# Patient Record
Sex: MALE | URBAN - METROPOLITAN AREA
[De-identification: names, ages, dates, MRNs, and addresses within clinical notes are randomized per-mention and may not be internally consistent; named-entity substitution may affect disease eponyms.]

---

## 2018-06-13 ENCOUNTER — OFFICE VISIT (OUTPATIENT)
Dept: FAMILY MEDICINE CLINIC | Age: 63
End: 2018-06-13

## 2018-06-13 VITALS — HEIGHT: 69 IN | WEIGHT: 280 LBS | BODY MASS INDEX: 41.47 KG/M2 | RESPIRATION RATE: 20 BRPM

## 2018-06-13 PROBLEM — I10 HTN (HYPERTENSION): Status: ACTIVE | Noted: 2018-06-13

## 2018-06-13 PROBLEM — E78.00 HYPERCHOLESTEREMIA: Status: ACTIVE | Noted: 2018-06-13

## 2018-06-13 PROBLEM — G47.30 SLEEP APNEA: Status: ACTIVE | Noted: 2018-06-13

## 2018-06-13 PROBLEM — J45.909 ASTHMA: Status: ACTIVE | Noted: 2018-06-13

## 2018-06-13 RX ORDER — GUAIFENESIN 100 MG/5ML
162 LIQUID (ML) ORAL
COMMUNITY
Start: 2010-02-28

## 2018-06-13 RX ORDER — BLOOD SUGAR DIAGNOSTIC
STRIP MISCELLANEOUS
COMMUNITY
Start: 2018-04-10 | End: 2018-07-24 | Stop reason: SDUPTHER

## 2018-06-13 RX ORDER — CLONIDINE HYDROCHLORIDE 0.3 MG/1
0.3 TABLET ORAL
COMMUNITY
Start: 2018-05-29 | End: 2018-11-20 | Stop reason: SDUPTHER

## 2018-06-13 RX ORDER — LIRAGLUTIDE 6 MG/ML
1.8 INJECTION SUBCUTANEOUS
COMMUNITY
Start: 2018-05-15 | End: 2018-10-08 | Stop reason: SDUPTHER

## 2018-06-13 RX ORDER — AMITRIPTYLINE HYDROCHLORIDE 25 MG/1
TABLET, FILM COATED ORAL
COMMUNITY
Start: 2018-04-16 | End: 2018-07-09 | Stop reason: SDUPTHER

## 2018-06-13 RX ORDER — EPINEPHRINE 0.3 MG/.3ML
INJECTION SUBCUTANEOUS
COMMUNITY
Start: 2018-05-19

## 2018-06-13 RX ORDER — METFORMIN HYDROCHLORIDE 1000 MG/1
TABLET ORAL
COMMUNITY
Start: 2018-05-21 | End: 2018-11-13 | Stop reason: SDUPTHER

## 2018-06-13 RX ORDER — SIMVASTATIN 20 MG/1
TABLET, FILM COATED ORAL
COMMUNITY
Start: 2018-04-09 | End: 2018-10-08 | Stop reason: SDUPTHER

## 2018-06-13 RX ORDER — GLIPIZIDE 10 MG/1
10 TABLET, FILM COATED, EXTENDED RELEASE ORAL 2 TIMES DAILY
COMMUNITY
Start: 2018-04-16 | End: 2018-07-18 | Stop reason: SDUPTHER

## 2018-06-13 RX ORDER — AMLODIPINE BESYLATE 10 MG/1
TABLET ORAL
COMMUNITY
Start: 2018-04-09 | End: 2018-10-15 | Stop reason: SDUPTHER

## 2018-06-13 RX ORDER — ALBUTEROL SULFATE 90 UG/1
AEROSOL, METERED RESPIRATORY (INHALATION)
COMMUNITY
Start: 2018-05-30

## 2018-06-27 ENCOUNTER — OFFICE VISIT (OUTPATIENT)
Dept: FAMILY MEDICINE CLINIC | Age: 63
End: 2018-06-27

## 2018-06-27 VITALS
HEIGHT: 69 IN | BODY MASS INDEX: 40.58 KG/M2 | TEMPERATURE: 98.3 F | HEART RATE: 110 BPM | SYSTOLIC BLOOD PRESSURE: 168 MMHG | OXYGEN SATURATION: 97 % | DIASTOLIC BLOOD PRESSURE: 88 MMHG | RESPIRATION RATE: 24 BRPM | WEIGHT: 274 LBS

## 2018-06-27 DIAGNOSIS — E78.2 MIXED HYPERLIPIDEMIA: Primary | ICD-10-CM

## 2018-06-27 DIAGNOSIS — I10 ESSENTIAL HYPERTENSION: ICD-10-CM

## 2018-06-27 DIAGNOSIS — E11.9 DIABETES MELLITUS WITHOUT COMPLICATION (HCC): ICD-10-CM

## 2018-06-27 PROBLEM — G47.33 OSA ON CPAP: Status: ACTIVE | Noted: 2018-06-13

## 2018-06-27 PROBLEM — E78.00 HYPERCHOLESTEREMIA: Status: RESOLVED | Noted: 2018-06-13 | Resolved: 2018-06-27

## 2018-06-27 PROBLEM — Z99.89 OSA ON CPAP: Status: ACTIVE | Noted: 2018-06-13

## 2018-06-27 PROBLEM — Z86.73 HISTORY OF CVA (CEREBROVASCULAR ACCIDENT): Status: ACTIVE | Noted: 2018-06-27

## 2018-06-27 RX ORDER — MECLIZINE HYDROCHLORIDE 25 MG/1
TABLET ORAL
COMMUNITY

## 2018-06-27 RX ORDER — FLUTICASONE PROPIONATE 50 MCG
2 SPRAY, SUSPENSION (ML) NASAL DAILY
COMMUNITY
End: 2018-07-06 | Stop reason: SDUPTHER

## 2018-06-27 RX ORDER — VALSARTAN 80 MG/1
80 TABLET ORAL DAILY
Qty: 30 TAB | Refills: 2 | Status: SHIPPED | OUTPATIENT
Start: 2018-06-27 | End: 2018-07-18 | Stop reason: SDUPTHER

## 2018-06-27 NOTE — MR AVS SNAPSHOT
[address] 9731 [facility] [id number] [phone] Patient: Brenda Pace 
MRN: [id number]:[id number] Visit Information Date & Time Provider Department Dept. Phone Encounter #  
 6/27/2018  3:15 PM Adria Chavis, 3 [facility] [phone] [id number] Follow-up Instructions Return in about 3 weeks (around 7/18/2018). Upcoming Health Maintenance Date Due Hepatitis C Screening 1955 Pneumococcal 19-64 Medium Risk (1 of 1 - PPSV23) 5/15/1975 DTaP/Tdap/Td series (1 - Tdap) 5/15/1977 FOBT Q 1 YEAR AGE 50-75 5/15/2006 ZOSTER VACCINE AGE 60> 3/15/2016 Influenza Age 5 to Adult 8/1/2018 Allergies as of 6/27/2018  Review Complete On: 6/27/2018 By: Adria Chavis MD  
  
 Severity Noted Reaction Type Reactions Bee Venom Protein (Honey Bee) High 06/13/2018    Anaphylaxis Pcn [Penicillins] High 06/13/2018    Anaphylaxis Tetracycline High 06/13/2018    Anaphylaxis Current Immunizations  Never Reviewed No immunizations on file. Not reviewed this visit You Were Diagnosed With   
  
 Codes Comments Mixed hyperlipidemia    -  Primary ICD-10-CM: U77.4 ICD-9-CM: 272.2 Essential hypertension     ICD-10-CM: I10 
ICD-9-CM: 401.9 Diabetes mellitus without complication ([facility]     HOP-88-FN: E11.9 ICD-9-CM: 250.00 Vitals BP Pulse Temp Resp Height(growth percentile) Weight(growth percentile) 168/88 (!) 110 98.3 °F (36.8 °C) 24 5' 9\" (1.753 m) 274 lb (124.3 kg) SpO2 BMI Smoking Status 97% 40.46 kg/m2 Never Smoker BMI and BSA Data Body Mass Index Body Surface Area 40.46 kg/m 2 2.46 m 2 Preferred Pharmacy Pharmacy Name Phone [phone], [address] [phone] Your Updated Medication List  
  
   
 This list is accurate as of 6/27/18  4:16 PM.  Always use your most recent med list.  
  
  
  
  
 ACCU-CHEK AYDEN PLUS TEST STRP strip Generic drug:  glucose blood VI test strips  
  
 amitriptyline 25 mg tablet Commonly known as:  ELAVIL  
  
 amLODIPine 10 mg tablet Commonly known as:  NORVASC  
  
 aspirin 81 mg chewable tablet 162 mg.  
  
 cloNIDine HCl 0.3 mg tablet Commonly known as:  CATAPRES  
0.3 mg every morning. EPINEPHrine 0.3 mg/0.3 mL injection Commonly known as:  EPIPEN  
  
 FLONASE ALLERGY RELIEF 50 mcg/actuation nasal spray Generic drug:  fluticasone 2 Sprays by Both Nostrils route daily. glipiZIDE SR 10 mg CR tablet Commonly known as:  GLUCOTROL XL  
10 mg two (2) times a day. meclizine 25 mg tablet Commonly known as:  ANTIVERT Take  by mouth three (3) times daily as needed. metFORMIN 1,000 mg tablet Commonly known as:  GLUCOPHAGE NOVOFINE 30 30 gauge x 1/3\" Generic drug:  Insulin Needles (Disposable)  
  
 simvastatin 20 mg tablet Commonly known as:  ZOCOR  
  
 valsartan 80 mg tablet Commonly known as:  DIOVAN Take 1 Tab by mouth daily. VENTOLIN HFA 90 mcg/actuation inhaler Generic drug:  albuterol VICTOZA 3-MERCEDES 0.6 mg/0.1 mL (18 mg/3 mL) Pnij Generic drug:  Liraglutide 1.8 mg.  
  
  
  
  
Prescriptions Sent to Pharmacy Refills  
 valsartan (DIOVAN) 80 mg tablet 2 Sig: Take 1 Tab by mouth daily. Class: Normal  
 Pharmacy: [address] [place] [address] #: [phone] Route: Oral  
  
Follow-up Instructions Return in about 3 weeks (around 7/18/2018). To-Do List   
 06/29/2018 Lab:  CBC WITH AUTOMATED DIFF   
  
 06/29/2018 Lab:  HEMOGLOBIN A1C WITH EAG   
  
 06/29/2018 Lab:  LIPID PANEL   
  
 06/29/2018 Lab:  METABOLIC PANEL, COMPREHENSIVE   
  
 06/29/2018 Lab:  MICROALBUMIN, UR, RAND W/ MICROALB/CREAT RATIO 06/29/2018 Lab:  T4, FREE   
  
 06/29/2018 Lab:  TSH 3RD GENERATION Introducing [facility]! [employer] introduces [employer] patient portal. Now you can access parts of your medical record, email your doctor's office, and request medication refills online. 1. In your internet browser, go to [email or web address] 2. Click on the First Time User? Click Here link in the Sign In box. You will see the New Member Sign Up page. 3. Enter your [employer] Access Code exactly as it appears below. You will not need to use this code after youve completed the sign-up process. If you do not sign up before the expiration date, you must request a new code. · [employer] Access Code: [email or web address] Expires: 9/25/2018  4:16 PM 
 
4. Enter the last four digits of your Social Security Number (xxxx) and Date of Birth (mm/dd/yyyy) as indicated and click Submit. You will be taken to the next sign-up page. 5. Create a [employer] ID. This will be your [employer] login ID and cannot be changed, so think of one that is secure and easy to remember. 6. Create a [employer] password. You can change your password at any time. 7. Enter your Password Reset Question and Answer. This can be used at a later time if you forget your password. 8. Enter your e-mail address. You will receive e-mail notification when new information is available in [address] 9. Click Sign Up. You can now view and download portions of your medical record. 10. Click the Download Summary menu link to download a portable copy of your medical information. If you have questions, please visit the Frequently Asked Questions section of the [employer] website. Remember, [employer] is NOT to be used for urgent needs. For medical emergencies, dial 911. Now available from your iPhone and Android! Please provide this summary of care documentation to your next provider. If you have any questions after today's visit, please call [phone].

## 2018-06-27 NOTE — PROGRESS NOTES
George Rosado is a 59 y.o. male (: 1955) presenting to address:    Chief Complaint   Patient presents with   [address] Patient       Vitals:    18 1535   BP: 168/88   Pulse: (!) 110   Resp: 24   Temp: 98.3 °F (36.8 °C)   SpO2: 97%   Weight: 274 lb (124.3 kg)   Height: 5' 9\" (1.753 m)   PainSc:   0 - No pain       Hearing/Vision:   No exam data present    Learning Assessment:   No flowsheet data found. Depression Screening:     PHQ over the last two weeks 2018   Little interest or pleasure in doing things Not at all   Feeling down, depressed or hopeless Not at all   Total Score PHQ 2 0     Fall Risk Assessment:   No flowsheet data found. Abuse Screening:   No flowsheet data found. 1. Do you have an Advanced Directive? YES    2. Would you like information on Advanced Directives?  NO

## 2018-06-27 NOTE — PROGRESS NOTES
HISTORY OF PRESENT ILLNESS  Emmy Abreu is a 59 y.o. male. HPI  New pt  DM, fairly controlled, glucose is elevated in am 150-170, but 100-110 in the afternoon !, he is taking his meds daily  HTN, not controlled, bp is elevated  HLD, stable on zocor daily  Review of Systems   Constitutional: Negative for fever. Respiratory: Negative for cough and shortness of breath. Cardiovascular: Negative for chest pain and palpitations. Gastrointestinal: Negative for abdominal pain and nausea. Musculoskeletal: Negative for myalgias. Physical Exam   Constitutional: He is oriented to person, place, and time. Morbid obesity   Neck: Neck supple. No thyromegaly present. Cardiovascular: Regular rhythm and normal heart sounds. Tachycardia present. No murmur heard. Pulmonary/Chest: Effort normal and breath sounds normal. He has no wheezes. He has no rales. Abdominal: Soft. Bowel sounds are normal. There is no hepatosplenomegaly. There is no tenderness. Musculoskeletal: He exhibits no edema. Lymphadenopathy:     He has no cervical adenopathy. Neurological: He is alert and oriented to person, place, and time. Vitals reviewed. ASSESSMENT and PLAN  Diagnoses and all orders for this visit:    1. Mixed hyperlipidemia, controlled  -     LIPID PANEL; Future  -     METABOLIC PANEL, COMPREHENSIVE; Future  -     CBC WITH AUTOMATED DIFF; Future  -     T4, FREE; Future  -     TSH 3RD GENERATION; Future    2. Essential hypertension, not controlled, add diovan  -     LIPID PANEL; Future  -     METABOLIC PANEL, COMPREHENSIVE; Future  -     CBC WITH AUTOMATED DIFF; Future  -     valsartan (DIOVAN) 80 mg tablet; Take 1 Tab by mouth daily.  -     T4, FREE; Future  -     TSH 3RD GENERATION; Future    3. Diabetes mellitus without complication ([facility]  -     LIPID PANEL; Future  -     METABOLIC PANEL, COMPREHENSIVE; Future  -     HEMOGLOBIN A1C WITH EAG; Future  -     CBC WITH AUTOMATED DIFF;  Future  -     MICROALBUMIN, LEYDA, PÉREZ W/ MICROALB/CREAT RATIO; Future  -     T4, FREE; Future  -     TSH 3RD GENERATION;  Future  Will get copy of old records from previous pcp    rtc 3 wks

## 2018-07-02 ENCOUNTER — HOSPITAL ENCOUNTER (OUTPATIENT)
Dept: LAB | Age: 63
Discharge: HOME | End: 2018-07-02

## 2018-07-02 PROCEDURE — 99001 SPECIMEN HANDLING PT-LAB: CPT | Performed by: INTERNAL MEDICINE

## 2018-07-03 DIAGNOSIS — E03.9 ACQUIRED HYPOTHYROIDISM: Primary | ICD-10-CM

## 2018-07-03 LAB
ALBUMIN SERPL-MCNC: 4.6 G/DL (ref 3.6–4.8)
ALBUMIN/CREAT UR: 64.8 MG/G CREAT (ref 0–30)
ALBUMIN/GLOB SERPL: 1.5 {RATIO} (ref 1.2–2.2)
ALP SERPL-CCNC: 79 IU/L (ref 39–117)
ALT SERPL-CCNC: 26 IU/L (ref 0–44)
AST SERPL-CCNC: 26 IU/L (ref 0–40)
BASOPHILS # BLD AUTO: 0 X10E3/UL (ref 0–0.2)
BASOPHILS NFR BLD AUTO: 0 %
BILIRUB SERPL-MCNC: 0.2 MG/DL (ref 0–1.2)
BUN SERPL-MCNC: 15 MG/DL (ref 8–27)
BUN/CREAT SERPL: 13 (ref 10–24)
CALCIUM SERPL-MCNC: 9.7 MG/DL (ref 8.6–10.2)
CHLORIDE SERPL-SCNC: 98 MMOL/L (ref 96–106)
CHOLEST SERPL-MCNC: 126 MG/DL (ref 100–199)
CO2 SERPL-SCNC: 24 MMOL/L (ref 20–29)
CREAT SERPL-MCNC: 1.16 MG/DL (ref 0.76–1.27)
CREAT UR-MCNC: 141.7 MG/DL
EOSINOPHIL # BLD AUTO: 0.3 X10E3/UL (ref 0–0.4)
EOSINOPHIL NFR BLD AUTO: 3 %
ERYTHROCYTE [DISTWIDTH] IN BLOOD BY AUTOMATED COUNT: 16.9 % (ref 12.3–15.4)
EST. AVERAGE GLUCOSE BLD GHB EST-MCNC: 189 MG/DL
GLOBULIN SER CALC-MCNC: 3.1 G/DL (ref 1.5–4.5)
GLUCOSE SERPL-MCNC: 158 MG/DL (ref 65–99)
HBA1C MFR BLD: 8.2 % (ref 4.8–5.6)
HCT VFR BLD AUTO: 36.7 % (ref 37.5–51)
HDLC SERPL-MCNC: 28 MG/DL
HGB BLD-MCNC: 11.7 G/DL (ref 13–17.7)
IMM GRANULOCYTES # BLD: 0 X10E3/UL (ref 0–0.1)
IMM GRANULOCYTES NFR BLD: 0 %
INTERPRETATION, 910389: NORMAL
LDLC SERPL CALC-MCNC: 68 MG/DL (ref 0–99)
LYMPHOCYTES # BLD AUTO: 1.7 X10E3/UL (ref 0.7–3.1)
LYMPHOCYTES NFR BLD AUTO: 21 %
Lab: NORMAL
MCH RBC QN AUTO: 27.3 PG (ref 26.6–33)
MCHC RBC AUTO-ENTMCNC: 31.9 G/DL (ref 31.5–35.7)
MCV RBC AUTO: 86 FL (ref 79–97)
MICROALBUMIN UR-MCNC: 91.8 UG/ML
MONOCYTES # BLD AUTO: 0.5 X10E3/UL (ref 0.1–0.9)
MONOCYTES NFR BLD AUTO: 6 %
NEUTROPHILS # BLD AUTO: 6 X10E3/UL (ref 1.4–7)
NEUTROPHILS NFR BLD AUTO: 70 %
PLATELET # BLD AUTO: 191 X10E3/UL (ref 150–379)
POTASSIUM SERPL-SCNC: 4.4 MMOL/L (ref 3.5–5.2)
PROT SERPL-MCNC: 7.7 G/DL (ref 6–8.5)
RBC # BLD AUTO: 4.28 X10E6/UL (ref 4.14–5.8)
SODIUM SERPL-SCNC: 141 MMOL/L (ref 134–144)
T4 FREE SERPL-MCNC: 0.69 NG/DL (ref 0.82–1.77)
TRIGL SERPL-MCNC: 148 MG/DL (ref 0–149)
TSH SERPL DL<=0.005 MIU/L-ACNC: 9.74 UIU/ML (ref 0.45–4.5)
VLDLC SERPL CALC-MCNC: 30 MG/DL (ref 5–40)
WBC # BLD AUTO: 8.5 X10E3/UL (ref 3.4–10.8)

## 2018-07-03 RX ORDER — LEVOTHYROXINE SODIUM 75 UG/1
75 TABLET ORAL
Qty: 90 TAB | Refills: 0 | Status: SHIPPED | OUTPATIENT
Start: 2018-07-03 | End: 2018-07-18 | Stop reason: SDUPTHER

## 2018-07-03 NOTE — PROGRESS NOTES
A1c is elevated, he need to take victoza and his meds daily    Thyroid is low, need to start synthroid daily, which will improvehis metabolism/Diabetes/weight, Rx sent to the pharmacy    Cholesterol is good    Will discuss more next visit

## 2018-07-05 NOTE — PROGRESS NOTES
Spoke with patient, verified with 2 identifiers. Patient advised of results. Patient stated that he is not willing to take Synthroid due to issues in the past with it and his labs. Willing to discuss at next OV. No further concerns.

## 2018-07-09 RX ORDER — FLUTICASONE PROPIONATE 50 MCG
2 SPRAY, SUSPENSION (ML) NASAL DAILY
Qty: 1 BOTTLE | Refills: 3 | Status: SHIPPED | OUTPATIENT
Start: 2018-07-09

## 2018-07-09 RX ORDER — AMITRIPTYLINE HYDROCHLORIDE 25 MG/1
25 TABLET, FILM COATED ORAL
Qty: 90 TAB | Refills: 0 | Status: SHIPPED | OUTPATIENT
Start: 2018-07-09 | End: 2018-07-18 | Stop reason: SDUPTHER

## 2018-07-09 NOTE — TELEPHONE ENCOUNTER
Patient is requesting refills of Elavil 25mg   This medication is entered under \"Historical Provider\"  Last Visit: 06/27/18 He was a new patient.   Future Appointments  Date Time Provider Rohit Souza   7/18/2018 3:30 PM [facility]ener, [facility] Conf[facility]

## 2018-07-18 ENCOUNTER — OFFICE VISIT (OUTPATIENT)
Dept: FAMILY MEDICINE CLINIC | Age: 63
End: 2018-07-18

## 2018-07-18 VITALS
RESPIRATION RATE: 20 BRPM | SYSTOLIC BLOOD PRESSURE: 140 MMHG | TEMPERATURE: 98.5 F | BODY MASS INDEX: 40.88 KG/M2 | DIASTOLIC BLOOD PRESSURE: 90 MMHG | HEART RATE: 98 BPM | OXYGEN SATURATION: 96 % | HEIGHT: 69 IN | WEIGHT: 276 LBS

## 2018-07-18 DIAGNOSIS — E11.29 DIABETES MELLITUS WITH MICROALBUMINURIA (HCC): ICD-10-CM

## 2018-07-18 DIAGNOSIS — Z12.11 COLON CANCER SCREENING: ICD-10-CM

## 2018-07-18 DIAGNOSIS — R80.9 DIABETES MELLITUS WITH MICROALBUMINURIA (HCC): ICD-10-CM

## 2018-07-18 DIAGNOSIS — E03.9 ACQUIRED HYPOTHYROIDISM: Primary | ICD-10-CM

## 2018-07-18 DIAGNOSIS — F51.01 PRIMARY INSOMNIA: ICD-10-CM

## 2018-07-18 DIAGNOSIS — I10 ESSENTIAL HYPERTENSION: ICD-10-CM

## 2018-07-18 RX ORDER — AMITRIPTYLINE HYDROCHLORIDE 50 MG/1
50 TABLET, FILM COATED ORAL
Qty: 90 TAB | Refills: 1 | Status: SHIPPED | OUTPATIENT
Start: 2018-07-18 | End: 2019-03-21 | Stop reason: SDUPTHER

## 2018-07-18 RX ORDER — GLIPIZIDE 10 MG/1
10 TABLET, FILM COATED, EXTENDED RELEASE ORAL 2 TIMES DAILY
Qty: 180 TAB | Refills: 1 | Status: SHIPPED | OUTPATIENT
Start: 2018-07-18 | End: 2019-01-08 | Stop reason: SDUPTHER

## 2018-07-18 RX ORDER — LEVOTHYROXINE SODIUM 75 UG/1
75 TABLET ORAL
Qty: 90 TAB | Refills: 0 | Status: SHIPPED | OUTPATIENT
Start: 2018-07-18 | End: 2018-10-15 | Stop reason: SDUPTHER

## 2018-07-18 RX ORDER — VALSARTAN 80 MG/1
80 TABLET ORAL DAILY
Qty: 90 TAB | Refills: 1 | Status: SHIPPED | OUTPATIENT
Start: 2018-07-18 | End: 2018-07-30 | Stop reason: RX

## 2018-07-18 NOTE — MR AVS SNAPSHOT
[address] [place] [address] [address] 2201 [facility] [id number] [phone] Patient: Arslan Poag 
MRN: [id number]:[id number] Visit Information Date & Time Provider Department Dept. Phone Encounter #  
 7/18/2018  3:30 PM Caitlyn West, 3 [facility] [phone] [id number] Follow-up Instructions Return in about 3 months (around 10/18/2018). Upcoming Health Maintenance Date Due Hepatitis C Screening 1955 Pneumococcal 19-64 Medium Risk (1 of 1 - PPSV23) 5/15/1975 DTaP/Tdap/Td series (1 - Tdap) 5/15/1977 FOBT Q 1 YEAR AGE 50-75 5/15/2006 ZOSTER VACCINE AGE 60> 3/15/2016 Influenza Age 5 to Adult 8/1/2018 Allergies as of 7/18/2018  Review Complete On: 7/18/2018 By: Caitlyn West MD  
  
 Severity Noted Reaction Type Reactions Bee Venom Protein (Honey Bee) High 06/13/2018    Anaphylaxis Pcn [Penicillins] High 06/13/2018    Anaphylaxis Tetracycline High 06/13/2018    Anaphylaxis Current Immunizations  Never Reviewed No immunizations on file. Not reviewed this visit You Were Diagnosed With   
  
 Codes Comments Acquired hypothyroidism    -  Primary ICD-10-CM: E03.9 ICD-9-CM: 244.9 Diabetes mellitus with microalbuminuria (HCC)     ICD-10-CM: E11.29, R80.9 ICD-9-CM: 250.40, 791.0 Essential hypertension     ICD-10-CM: I10 
ICD-9-CM: 401.9 Primary insomnia     ICD-10-CM: F51.01 
ICD-9-CM: 307.42 Colon cancer screening     ICD-10-CM: Z12.11 ICD-9-CM: V76.51 Vitals BP Pulse Temp Resp Height(growth percentile) Weight(growth percentile) 140/90 (BP 1 Location: Left arm, BP Patient Position: Sitting) 98 98.5 °F (36.9 °C) (Oral) 20 5' 9\" (1.753 m) 276 lb (125.2 kg) SpO2 BMI Smoking Status 96% 40.76 kg/m2 Never Smoker Vitals History BMI and BSA Data Body Mass Index Body Surface Area  40.76 kg/m 2 2.47 m 2  
  
  
 Preferred Pharmacy Pharmacy Name Phone Diana [phone], [address] [phone] Your Updated Medication List  
  
   
This list is accurate as of 7/18/18  4:18 PM.  Always use your most recent med list.  
  
  
  
  
 ACCU-CHEK AYDEN PLUS TEST STRP strip Generic drug:  glucose blood VI test strips  
  
 amitriptyline 50 mg tablet Commonly known as:  ELAVIL Take 1 Tab by mouth nightly. amLODIPine 10 mg tablet Commonly known as:  NORVASC  
  
 aspirin 81 mg chewable tablet 162 mg.  
  
 cloNIDine HCl 0.3 mg tablet Commonly known as:  CATAPRES  
0.3 mg every morning. EPINEPHrine 0.3 mg/0.3 mL injection Commonly known as:  EPIPEN  
  
 fluticasone 50 mcg/actuation nasal spray Commonly known as:  FLONASE ALLERGY RELIEF  
2 Sprays by Both Nostrils route daily. glipiZIDE SR 10 mg CR tablet Commonly known as:  GLUCOTROL XL Take 1 Tab by mouth two (2) times a day. levothyroxine 75 mcg tablet Commonly known as:  SYNTHROID Take 1 Tab by mouth Daily (before breakfast). meclizine 25 mg tablet Commonly known as:  ANTIVERT Take  by mouth three (3) times daily as needed. metFORMIN 1,000 mg tablet Commonly known as:  GLUCOPHAGE NOVOFINE 30 30 gauge x 1/3\" Generic drug:  Insulin Needles (Disposable) Use daily with insulin  
  
 simvastatin 20 mg tablet Commonly known as:  ZOCOR  
  
 valsartan 80 mg tablet Commonly known as:  DIOVAN Take 1 Tab by mouth daily. VENTOLIN HFA 90 mcg/actuation inhaler Generic drug:  albuterol VICTOZA 3-MERCEDES 0.6 mg/0.1 mL (18 mg/3 mL) Pnij Generic drug:  Liraglutide 1.8 mg.  
  
  
  
  
Prescriptions Sent to Pharmacy Refills  
 levothyroxine (SYNTHROID) 75 mcg tablet 0 Sig: Take 1 Tab by mouth Daily (before breakfast).   
 Class: Normal  
 Pharmacy: [id number] [place] [address], [address] [place] [address] [facility]. Ph #: [phone] Route: Oral  
 amitriptyline (ELAVIL) 50 mg tablet 1 Sig: Take 1 Tab by mouth nightly. Class: Normal  
 Pharmacy: [address] [place] [address]. Ph #: [phone] Route: Oral  
 glipiZIDE SR (GLUCOTROL XL) 10 mg CR tablet 1 Sig: Take 1 Tab by mouth two (2) times a day. Class: Normal  
 Pharmacy: [address] [place] [address]. Ph #: [phone] Route: Oral  
 valsartan (DIOVAN) 80 mg tablet 1 Sig: Take 1 Tab by mouth daily. Class: Normal  
 Pharmacy: [address] [place] [address]. Ph #: [phone] Route: Oral  
  
Follow-up Instructions Return in about 3 months (around 10/18/2018). To-Do List   
 07/18/2018 Lab:  OCCULT BLOOD, IMMUNOASSAY (FIT)   
  
 07/18/2018 Lab:  T4, FREE   
  
 07/18/2018 Lab:  TSH 3RD GENERATION Introducing [facility]! [facility] introduces [employer] patient portal. Now you can access parts of your medical record, email your doctor's office, and request medication refills online. 1. In your internet browser, go to [email or web address] 2. Click on the First Time User? Click Here link in the Sign In box. You will see the New Member Sign Up page. 3. Enter your [employer] Access Code exactly as it appears below. You will not need to use this code after youve completed the sign-up process. If you do not sign up before the expiration date, you must request a new code. · [employer] Access Code: [email or web address] Expires: 9/25/2018  4:16 PM 
 
4. Enter the last four digits of your Social Security Number (xxxx) and Date of Birth (mm/dd/yyyy) as indicated and click Submit. You will be taken to the next sign-up page. 5. Create a [employer] ID. This will be your [employer] login ID and cannot be changed, so think of one that is secure and easy to remember. 6. Create a [employer] password. You can change your password at any time. 7. Enter your Password Reset Question and Answer. This can be used at a later time if you forget your password. 8. Enter your e-mail address. You will receive e-mail notification when new information is available in [address] 9. Click Sign Up. You can now view and download portions of your medical record. 10. Click the Download Summary menu link to download a portable copy of your medical information. If you have questions, please visit the Frequently Asked Questions section of the [employer] website. Remember, [employer] is NOT to be used for urgent needs. For medical emergencies, dial 911. Now available from your iPhone and Android! Please provide this summary of care documentation to your next provider. If you have any questions after today's visit, please call [phone].

## 2018-07-18 NOTE — PROGRESS NOTES
Doris Lynne is a 59 y.o. male (: 1955) presenting to address:    Chief Complaint   Patient presents with    Medication Evaluation       Vitals:    18 1514   BP: 140/90   Pulse: 98   Resp: 20   Temp: 98.5 °F (36.9 °C)   TempSrc: Oral   SpO2: 96%   Weight: 276 lb (125.2 kg)   Height: 5' 9\" (1.753 m)   PainSc:   0 - No pain       Hearing/Vision:   No exam data present    Learning Assessment:   No flowsheet data found. Depression Screening:     PHQ over the last two weeks 2018   Little interest or pleasure in doing things Not at all   Feeling down, depressed, irritable, or hopeless Not at all   Total Score PHQ 2 0     Fall Risk Assessment:   No flowsheet data found. Abuse Screening:     Abuse Screening Questionnaire 2018   Do you ever feel afraid of your partner? N   Are you in a relationship with someone who physically or mentally threatens you? N   Is it safe for you to go home? Y     Coordination of Care Questionaire:   1. Have you been to the ER, urgent care clinic since your last visit? Hospitalized since your last visit? NO    2. Have you seen or consulted any other health care providers outside of the [address] [place] [address] since your last visit? Include any pap smears or colon screening. NO    Advanced Directive:   1. Do you have an Advanced Directive? YES    2. Would you like information on Advanced Directives?  NO

## 2018-07-18 NOTE — PROGRESS NOTES
HISTORY OF PRESENT ILLNESS  Horace Browne is a 59 y.o. male. HPI  Hypothyroid, not controlled, his recent labs noted, TSh was elevated and his free T 4 was low, discussed treatment today, explained benefits on his lipids/weight/DM  DM, not well controlled, recent a1c was 8.2, he has been using 1.8 mg of Victoza daily since last visit, need refill on glipizide, glucose is 140-150 in am  Insomnia, stable, need refill on elavil, he use 50 mg qhs  HTN, improved, bp is better since we added diovan, will continue meds  Review of Systems   Constitutional: Positive for malaise/fatigue. Negative for weight loss. Respiratory: Negative for cough and shortness of breath. Cardiovascular: Negative for chest pain and palpitations. Gastrointestinal: Negative for abdominal pain and nausea. Musculoskeletal: Negative for myalgias. Neurological: Negative for headaches. Physical Exam   Constitutional: He is oriented to person, place, and time. Neck: No thyromegaly present. Cardiovascular: Normal rate, regular rhythm and normal heart sounds. Pulmonary/Chest: Effort normal and breath sounds normal.   Abdominal: Soft. There is no tenderness. Musculoskeletal: He exhibits no edema. Neurological: He is alert and oriented to person, place, and time. Vitals reviewed. ASSESSMENT and PLAN  Diagnoses and all orders for this visit:    1. Acquired hypothyroidism, not controlled, start synthroid, repeat labs in 6 weeks  -     levothyroxine (SYNTHROID) 75 mcg tablet; Take 1 Tab by mouth Daily (before breakfast). -     TSH 3RD GENERATION; Future  -     T4, FREE; Future    2. Diabetes mellitus with microalbuminuria ([facility] not well controlled, he will continue meds, continue daily maximum dose of victoza  -     glipiZIDE SR (GLUCOTROL XL) 10 mg CR tablet; Take 1 Tab by mouth two (2) times a day. 3. Essential hypertension, stable  -     valsartan (DIOVAN) 80 mg tablet; Take 1 Tab by mouth daily.     4. Primary insomnia, stable  -     amitriptyline (ELAVIL) 50 mg tablet; Take 1 Tab by mouth nightly. 5. Colon cancer screening  -     OCCULT BLOOD, IMMUNOASSAY (FIT); Future    Lab Results   Component Value Date/Time    Cholesterol, total 126 07/02/2018 12:00 AM    HDL Cholesterol 28 (L) 07/02/2018 12:00 AM    LDL, calculated 68 07/02/2018 12:00 AM    VLDL, calculated 30 07/02/2018 12:00 AM    Triglyceride 148 07/02/2018 12:00 AM     Lab Results   Component Value Date/Time    Sodium 141 07/02/2018 12:00 AM    Potassium 4.4 07/02/2018 12:00 AM    Chloride 98 07/02/2018 12:00 AM    CO2 24 07/02/2018 12:00 AM    Glucose 158 (H) 07/02/2018 12:00 AM    BUN 15 07/02/2018 12:00 AM    Creatinine 1.16 07/02/2018 12:00 AM    BUN/Creatinine ratio 13 07/02/2018 12:00 AM    GFR est AA 78 07/02/2018 12:00 AM    GFR est non-AA 67 07/02/2018 12:00 AM    Calcium 9.7 07/02/2018 12:00 AM    Bilirubin, total 0.2 07/02/2018 12:00 AM    AST (SGOT) 26 07/02/2018 12:00 AM    Alk.  phosphatase 79 07/02/2018 12:00 AM    Protein, total 7.7 07/02/2018 12:00 AM    Albumin 4.6 07/02/2018 12:00 AM    A-G Ratio 1.5 07/02/2018 12:00 AM    ALT (SGPT) 26 07/02/2018 12:00 AM     Lab Results   Component Value Date/Time    TSH 9.740 (H) 07/02/2018 12:00 AM     Lab Results   Component Value Date/Time    Hemoglobin A1c 8.2 (H) 07/02/2018 12:00 AM   rtc 3 mos

## 2018-07-24 RX ORDER — BLOOD SUGAR DIAGNOSTIC
STRIP MISCELLANEOUS
Qty: 100 STRIP | Refills: 3 | Status: SHIPPED | OUTPATIENT
Start: 2018-07-24 | End: 2018-08-08 | Stop reason: SDUPTHER

## 2018-07-30 ENCOUNTER — TELEPHONE (OUTPATIENT)
Dept: FAMILY MEDICINE CLINIC | Age: 63
End: 2018-07-30

## 2018-07-30 RX ORDER — LOSARTAN POTASSIUM 50 MG/1
50 TABLET ORAL DAILY
Qty: 90 TAB | Refills: 1 | Status: SHIPPED | OUTPATIENT
Start: 2018-07-30 | End: 2019-01-21 | Stop reason: SDUPTHER

## 2018-08-03 NOTE — TELEPHONE ENCOUNTER
Patient is needed to have a new  ACCU-CHEK AYDEN PLUS TEST Kit. Patient is stating that his machine has been acting weird. Please send to the [employer].

## 2018-08-06 ENCOUNTER — TELEPHONE (OUTPATIENT)
Dept: FAMILY MEDICINE CLINIC | Age: 63
End: 2018-08-06

## 2018-08-06 NOTE — TELEPHONE ENCOUNTER
Pt says he is calling to see why we haven't processed his request for his blood sugar meter and everything that goes with it. He says his pharmacy has faxed us over twice and we haven't responded to them. So he would like the status and a phone call once it is approved.

## 2018-08-08 RX ORDER — BLOOD SUGAR DIAGNOSTIC
STRIP MISCELLANEOUS
Qty: 100 STRIP | Refills: 3 | Status: SHIPPED | OUTPATIENT
Start: 2018-08-08

## 2018-08-08 RX ORDER — BLOOD-GLUCOSE METER
EACH MISCELLANEOUS
Qty: 1 EACH | Refills: 0 | Status: SHIPPED | OUTPATIENT
Start: 2018-08-08

## 2018-08-31 ENCOUNTER — HOSPITAL ENCOUNTER (OUTPATIENT)
Dept: LAB | Age: 63
Discharge: HOME | End: 2018-08-31
Payer: MEDICARE

## 2018-08-31 PROCEDURE — 82274 ASSAY TEST FOR BLOOD FECAL: CPT | Performed by: INTERNAL MEDICINE

## 2018-09-09 LAB — HEMOCCULT STL QL IA: NEGATIVE

## 2018-09-13 ENCOUNTER — TELEPHONE (OUTPATIENT)
Dept: FAMILY MEDICINE CLINIC | Age: 63
End: 2018-09-13

## 2018-09-13 NOTE — TELEPHONE ENCOUNTER
Pt called during a lunch w/ a medication problem. He states that his test strips were wrong. They were supposed to be taking it for twice a day instead it was written for once a called. [facility]ed over the form to sign off on to fix the rx but have not heard anything from us.        Please advise w/ a covering provider if this can be addressed today by a different provider since Dr Winston Rose is already out of the office today

## 2018-09-17 NOTE — TELEPHONE ENCOUNTER
I made attempt to contact patient on 09/13/2018  Dr. Ira Nicole also made an attempt to contact patient  Per Dr. Ira Nicole tell patient that he needs to be checking glucose once a day, but at different times. If one day he checks in the AM then the next day check it in the PM.    2nd attempt to contact patient today and home number disconnected   Unable to get though on mobile   Unable to leave voicemail.

## 2018-10-08 RX ORDER — SIMVASTATIN 20 MG/1
20 TABLET, FILM COATED ORAL
Qty: 90 TAB | Refills: 1 | Status: SHIPPED | OUTPATIENT
Start: 2018-10-08 | End: 2019-04-01 | Stop reason: SDUPTHER

## 2018-10-08 RX ORDER — LIRAGLUTIDE 6 MG/ML
1.8 INJECTION SUBCUTANEOUS DAILY
Qty: 3 ADJUSTABLE DOSE PRE-FILLED PEN SYRINGE | Refills: 3 | Status: SHIPPED | OUTPATIENT
Start: 2018-10-08

## 2018-10-08 NOTE — TELEPHONE ENCOUNTER
Patient is requesting 90 day supply:    Victoza 3 MERCEDES  Last Filled: 05/15/18  Historical Provider    Radha  Last filled: 04/0918  Historical Provider    Last Visit: 07/18/18   No future appointments.

## 2018-10-15 DIAGNOSIS — F51.01 PRIMARY INSOMNIA: ICD-10-CM

## 2018-10-15 DIAGNOSIS — R80.9 DIABETES MELLITUS WITH MICROALBUMINURIA (HCC): ICD-10-CM

## 2018-10-15 DIAGNOSIS — E11.29 DIABETES MELLITUS WITH MICROALBUMINURIA (HCC): ICD-10-CM

## 2018-10-15 DIAGNOSIS — E03.9 ACQUIRED HYPOTHYROIDISM: ICD-10-CM

## 2018-10-15 RX ORDER — LEVOTHYROXINE SODIUM 75 UG/1
75 TABLET ORAL
Qty: 90 TAB | Refills: 0 | Status: SHIPPED | OUTPATIENT
Start: 2018-10-15 | End: 2019-01-08 | Stop reason: SDUPTHER

## 2018-10-15 RX ORDER — AMLODIPINE BESYLATE 10 MG/1
10 TABLET ORAL DAILY
Qty: 90 TAB | Refills: 0 | Status: SHIPPED | OUTPATIENT
Start: 2018-10-15 | End: 2019-01-15 | Stop reason: SDUPTHER

## 2018-10-15 NOTE — TELEPHONE ENCOUNTER
Patient is requesting refills of Synthroid and amlodipine   90 Day Supply    Synthroid  Last Filled: 07/18/18  Qty: 90  Refills: 0    Amlodipine  Last Filled: 04/19/18 Historical Provider  Last Visit: 06/27/18  No future appointments.

## 2018-10-15 NOTE — TELEPHONE ENCOUNTER
Pt is in need of a prescription refill of the following medication. He states that a 90 day supply is needed  to have covered by insurance, please advise.

## 2018-10-16 ENCOUNTER — TELEPHONE (OUTPATIENT)
Dept: FAMILY MEDICINE CLINIC | Age: 63
End: 2018-10-16

## 2018-10-16 NOTE — TELEPHONE ENCOUNTER
The [employer] called on behalf of pt asking if its possible to change his scripts of Novofine 30 30 gauge x 1/3\" to Novofine auto cover instead, please advise.

## 2018-10-17 ENCOUNTER — TELEPHONE (OUTPATIENT)
Dept: FAMILY MEDICINE CLINIC | Age: 63
End: 2018-10-17

## 2018-10-17 DIAGNOSIS — E78.2 MIXED HYPERLIPIDEMIA: ICD-10-CM

## 2018-10-17 DIAGNOSIS — R80.9 DIABETES MELLITUS WITH MICROALBUMINURIA (HCC): ICD-10-CM

## 2018-10-17 DIAGNOSIS — E03.9 ACQUIRED HYPOTHYROIDISM: Primary | ICD-10-CM

## 2018-10-17 DIAGNOSIS — E11.29 DIABETES MELLITUS WITH MICROALBUMINURIA (HCC): ICD-10-CM

## 2018-10-17 DIAGNOSIS — I10 ESSENTIAL HYPERTENSION: ICD-10-CM

## 2018-10-24 ENCOUNTER — TELEPHONE (OUTPATIENT)
Dept: FAMILY MEDICINE CLINIC | Age: 63
End: 2018-10-24

## 2018-10-24 ENCOUNTER — OFFICE VISIT (OUTPATIENT)
Dept: FAMILY MEDICINE CLINIC | Age: 63
End: 2018-10-24

## 2018-10-24 VITALS
RESPIRATION RATE: 20 BRPM | HEART RATE: 99 BPM | BODY MASS INDEX: 40.73 KG/M2 | WEIGHT: 275 LBS | TEMPERATURE: 97.5 F | SYSTOLIC BLOOD PRESSURE: 140 MMHG | HEIGHT: 69 IN | OXYGEN SATURATION: 97 % | DIASTOLIC BLOOD PRESSURE: 84 MMHG

## 2018-10-24 DIAGNOSIS — Z23 ENCOUNTER FOR IMMUNIZATION: ICD-10-CM

## 2018-10-24 DIAGNOSIS — E11.29 DIABETES MELLITUS WITH MICROALBUMINURIA (HCC): Primary | ICD-10-CM

## 2018-10-24 DIAGNOSIS — E78.2 MIXED HYPERLIPIDEMIA: ICD-10-CM

## 2018-10-24 DIAGNOSIS — I10 ESSENTIAL HYPERTENSION: ICD-10-CM

## 2018-10-24 DIAGNOSIS — R80.9 DIABETES MELLITUS WITH MICROALBUMINURIA (HCC): Primary | ICD-10-CM

## 2018-10-24 DIAGNOSIS — E03.9 ACQUIRED HYPOTHYROIDISM: ICD-10-CM

## 2018-10-24 NOTE — TELEPHONE ENCOUNTER
Pt states that he forgot to tell Dr Babs Ivy about his insulin needles. He does not like the current needles that he is on and would like to change it to BD brand needles.  He wants it sent to [facility] on [address]

## 2018-10-24 NOTE — PROGRESS NOTES
HISTORY OF PRESENT ILLNESS Darryl Pineda is a 59 y.o. male. HPI 
DM, improving, his glucose is 150 in am, but down to  in the afternoon, he is due for eyes exam 
Hypothyroid, improving, he is on synthroid daily, he had labs today, results pending HTN, stable, bp is controlled on meds daily HLD, stable on zocor daily He had labs today, results pending Review of Systems Constitutional: Negative for malaise/fatigue. Respiratory: Negative for cough and shortness of breath. Cardiovascular: Negative for chest pain and palpitations. Gastrointestinal: Negative for abdominal pain and nausea. Musculoskeletal: Negative for myalgias. Neurological: Negative for headaches. Physical Exam  
Constitutional: He is oriented to person, place, and time. Neck: No thyromegaly present. Cardiovascular: Normal rate, regular rhythm and normal heart sounds. Pulmonary/Chest: Effort normal and breath sounds normal.  
Abdominal: Soft. There is no tenderness. Musculoskeletal: He exhibits no edema. Neurological: He is alert and oriented to person, place, and time. Skin:  
Feet:normal sensation to microfilament test, no rash, no deformity, good pulses Vitals reviewed. ASSESSMENT and PLAN Diagnoses and all orders for this visit: 1. Diabetes mellitus with microalbuminuria ([facility] [address] improving 
-     REFERRAL TO OPTOMETRY 2. Acquired hypothyroidism, improving 3. Mixed hyperlipidemia, stable 4. Essential hypertension, stable Continue current meds 
rtc 3 mos 5. Encounter for immunization 
-     INFLUENZA VIRUS VAC QUAD,SPLIT,PRESV FREE SYRINGE IM 
-     ADMIN INFLUENZA VIRUS VAC Lab Results Component Value Date/Time TSH 9.740 (H) 07/02/2018 12:00 AM  
 
Lab Results Component Value Date/Time  Sodium 141 07/02/2018 12:00 AM  
 Potassium 4.4 07/02/2018 12:00 AM  
 Chloride 98 07/02/2018 12:00 AM  
 CO2 24 07/02/2018 12:00 AM  
 Glucose 158 (H) 07/02/2018 12:00 AM  
 BUN 15 07/02/2018 12:00 AM  
 Creatinine 1.16 07/02/2018 12:00 AM  
 BUN/Creatinine ratio 13 07/02/2018 12:00 AM  
 GFR est AA 78 07/02/2018 12:00 AM  
 GFR est non-AA 67 07/02/2018 12:00 AM  
 Calcium 9.7 07/02/2018 12:00 AM  
 Bilirubin, total 0.2 07/02/2018 12:00 AM  
 AST (SGOT) 26 07/02/2018 12:00 AM  
 Alk. phosphatase 79 07/02/2018 12:00 AM  
 Protein, total 7.7 07/02/2018 12:00 AM  
 Albumin 4.6 07/02/2018 12:00 AM  
 A-G Ratio 1.5 07/02/2018 12:00 AM  
 ALT (SGPT) 26 07/02/2018 12:00 AM  
 
Lab Results Component Value Date/Time  Cholesterol, total 126 07/02/2018 12:00 AM  
 HDL Cholesterol 28 (L) 07/02/2018 12:00 AM  
 LDL, calculated 68 07/02/2018 12:00 AM  
 VLDL, calculated 30 07/02/2018 12:00 AM  
 Triglyceride 148 07/02/2018 12:00 AM

## 2018-10-24 NOTE — TELEPHONE ENCOUNTER
He need to ask his pharmacist to change the brand, that's ok with me, they should be able to do that for him with his current Rx

## 2018-10-24 NOTE — PROGRESS NOTES
Bree Jay is a 59 y.o. male (: 1955) presenting to address: Chief Complaint Patient presents with  Medication Evaluation Vitals:  
 10/24/18 1440 Pulse: 99 Resp: 20 Temp: 97.5 °F (36.4 °C) TempSrc: Oral  
SpO2: 97% Weight: 275 lb (124.7 kg) Height: 5' 9\" (1.753 m) PainSc:   0 - No pain Hearing/Vision: No exam data present Learning Assessment:  
No flowsheet data found. Depression Screening: PHQ over the last two weeks 10/24/2018 Little interest or pleasure in doing things Not at all Feeling down, depressed, irritable, or hopeless Not at all Total Score PHQ 2 0 Fall Risk Assessment:  
No flowsheet data found. Abuse Screening:  
 
Abuse Screening Questionnaire 2018 Do you ever feel afraid of your partner? Bebeto Brine Are you in a relationship with someone who physically or mentally threatens you? Bebeto Brine Is it safe for you to go home? Shain Avalos Coordination of Care Questionaire: 1. Have you been to the ER, urgent care clinic since your last visit? Hospitalized since your last visit? NO 
 
2. Have you seen or consulted any other health care providers outside of the [address] [place] [address] since your last visit? Include any pap smears or colon screening. NO Advanced Directive: 1. Do you have an Advanced Directive? YES 
 
2. Would you like information on Advanced Directives?  NO

## 2018-10-26 DIAGNOSIS — E11.29 DIABETES MELLITUS WITH MICROALBUMINURIA (HCC): Primary | ICD-10-CM

## 2018-10-26 DIAGNOSIS — R80.9 DIABETES MELLITUS WITH MICROALBUMINURIA (HCC): Primary | ICD-10-CM

## 2018-10-26 NOTE — TELEPHONE ENCOUNTER
Pt is calling in regards to new order to be placed for BD Brand needles. Pt spoke with pharmacy who informed him that an order has to be placed in order to be prescribed and is currently running low on his older order, please advise.

## 2018-10-27 LAB
ALBUMIN SERPL-MCNC: 4.2 G/DL (ref 3.6–4.8)
ALBUMIN/GLOB SERPL: 1.2 {RATIO} (ref 1.2–2.2)
ALP SERPL-CCNC: 77 IU/L (ref 39–117)
ALT SERPL-CCNC: 32 IU/L (ref 0–44)
AST SERPL-CCNC: 31 IU/L (ref 0–40)
BILIRUB SERPL-MCNC: 0.2 MG/DL (ref 0–1.2)
BUN SERPL-MCNC: 13 MG/DL (ref 8–27)
BUN/CREAT SERPL: 12 (ref 10–24)
CALCIUM SERPL-MCNC: 9.8 MG/DL (ref 8.6–10.2)
CHLORIDE SERPL-SCNC: 99 MMOL/L (ref 96–106)
CHOLEST SERPL-MCNC: 127 MG/DL (ref 100–199)
CO2 SERPL-SCNC: 22 MMOL/L (ref 20–29)
CREAT SERPL-MCNC: 1.08 MG/DL (ref 0.76–1.27)
ERYTHROCYTE [DISTWIDTH] IN BLOOD BY AUTOMATED COUNT: 15 % (ref 12.3–15.4)
EST. AVERAGE GLUCOSE BLD GHB EST-MCNC: 214 MG/DL
GLOBULIN SER CALC-MCNC: 3.4 G/DL (ref 1.5–4.5)
GLUCOSE SERPL-MCNC: 139 MG/DL (ref 65–99)
HBA1C MFR BLD: 9.1 % (ref 4.8–5.6)
HCT VFR BLD AUTO: 36.2 % (ref 37.5–51)
HDLC SERPL-MCNC: 30 MG/DL
HGB BLD-MCNC: 11.5 G/DL (ref 13–17.7)
INTERPRETATION, 910389: NORMAL
LDLC SERPL CALC-MCNC: 69 MG/DL (ref 0–99)
Lab: NORMAL
MCH RBC QN AUTO: 26.6 PG (ref 26.6–33)
MCHC RBC AUTO-ENTMCNC: 31.8 G/DL (ref 31.5–35.7)
MCV RBC AUTO: 84 FL (ref 79–97)
PLATELET # BLD AUTO: 155 X10E3/UL (ref 150–379)
POTASSIUM SERPL-SCNC: 4.5 MMOL/L (ref 3.5–5.2)
PROT SERPL-MCNC: 7.6 G/DL (ref 6–8.5)
RBC # BLD AUTO: 4.32 X10E6/UL (ref 4.14–5.8)
SODIUM SERPL-SCNC: 139 MMOL/L (ref 134–144)
T4 FREE SERPL-MCNC: 1.02 NG/DL (ref 0.82–1.77)
TRIGL SERPL-MCNC: 140 MG/DL (ref 0–149)
TSH SERPL DL<=0.005 MIU/L-ACNC: 2.37 UIU/ML (ref 0.45–4.5)
VLDLC SERPL CALC-MCNC: 28 MG/DL (ref 5–40)
WBC # BLD AUTO: 6.5 X10E3/UL (ref 3.4–10.8)

## 2018-10-29 NOTE — PROGRESS NOTES
Thyroid is stable now, continue current ose synthroid  His a1c is worse 9.1 !!, please advise pt to increase victoza to 1.8 mg, or we can add a tablet called Jardiance ( if his insurance covers it), or we can place ref to Endo ??   Lipids are very good

## 2018-10-30 NOTE — PROGRESS NOTES
Informed patient of lab results. Patient verbalized understanding. Patient states that he will increase his dose of Victoza and will give Endocrinology a try.  Please put in referral.

## 2018-10-30 NOTE — TELEPHONE ENCOUNTER
Pt called again stating that he forgot to ask Kandis Hoskins about getting a new order for BID insulin pens.  Please advise

## 2018-10-31 DIAGNOSIS — R80.9 DIABETES MELLITUS WITH MICROALBUMINURIA (HCC): Primary | ICD-10-CM

## 2018-10-31 DIAGNOSIS — E11.29 DIABETES MELLITUS WITH MICROALBUMINURIA (HCC): Primary | ICD-10-CM

## 2018-11-07 ENCOUNTER — TELEPHONE (OUTPATIENT)
Dept: FAMILY MEDICINE CLINIC | Age: 63
End: 2018-11-07

## 2018-11-07 NOTE — TELEPHONE ENCOUNTER
[facility] called to inform that they do not take [employer]. Please refer patient elswhere.

## 2018-11-07 NOTE — TELEPHONE ENCOUNTER
Pharmacy called and stated they don't have the pen needles that was sent in on 10/26/18. They did it to be BD Ultrafine pen needles 1/2 inch (12.7 mil )  29 esther.  Please advise

## 2018-11-09 NOTE — TELEPHONE ENCOUNTER
Referral printed and office notes faxed to:  Rochelle Baron M.D.  [address] [place] [address]  [address]  [address]  Phone: [phone]  Fax: [phone]

## 2018-11-13 RX ORDER — METFORMIN HYDROCHLORIDE 1000 MG/1
1000 TABLET ORAL 2 TIMES DAILY WITH MEALS
Qty: 180 TAB | Refills: 1 | Status: SHIPPED | OUTPATIENT
Start: 2018-11-13 | End: 2019-05-16 | Stop reason: SDUPTHER

## 2018-11-13 NOTE — TELEPHONE ENCOUNTER
Pharmacy called to request a new sized pin needles for patient. Requesting an 8mil X 31 esther sized pin needles.

## 2018-11-13 NOTE — TELEPHONE ENCOUNTER
Patient is requesting refill Metformin    Last Filled by Historical Provider    Last Visit: 10/24/18  Future Appointments   Date Time Provider Rohit Souza   1/30/2019  2:30 PM Vanna Houston MD [facility]

## 2018-11-15 RX ORDER — PEN NEEDLE, DIABETIC 30 GX3/16"
NEEDLE, DISPOSABLE MISCELLANEOUS
Qty: 100 PEN NEEDLE | Refills: 3 | Status: SHIPPED | OUTPATIENT
Start: 2018-11-15

## 2018-11-16 ENCOUNTER — TELEPHONE (OUTPATIENT)
Dept: FAMILY MEDICINE CLINIC | Age: 63
End: 2018-11-16

## 2018-11-16 NOTE — TELEPHONE ENCOUNTER
Pharmacy called requesting a change in his recent rx for his insulin needles. Pt needs the 30 esther and 8 mil and what was sent in was 31 esther x 5/6.  Kulwinder saldivar

## 2018-11-20 NOTE — TELEPHONE ENCOUNTER
Patient is requesting refill of Clonidine and a 90 day supply  Last Filled: 05/29/18 by \"Historical Provider\".   Last Visit: 10/24/18  Future Appointments   Date Time Provider Rohit Souza   1/30/2019  2:30 PM Martín Galindo  [address] Confirmed

## 2018-11-27 RX ORDER — CLONIDINE HYDROCHLORIDE 0.3 MG/1
0.3 TABLET ORAL
Qty: 90 TAB | Refills: 1 | Status: SHIPPED | OUTPATIENT
Start: 2018-11-27 | End: 2019-05-22 | Stop reason: SDUPTHER

## 2018-12-06 NOTE — TELEPHONE ENCOUNTER
Pt called to check on the status of his prescription for pen needles refilled. Pt states the pharmacy has been faxing over information in regards to having this prescription filled and still has not gotten a response. Pt has been without for a week and was sent the wrong size needles prior. Pt is in need of 30 gauge and 8 mil 1/3 inch sent to pharmacy, please advise.

## 2018-12-06 NOTE — TELEPHONE ENCOUNTER
Spoke with Pharmacist and they don not have the particular needles in stock, but they are going to order them for the patient if they can.

## 2019-01-08 DIAGNOSIS — E03.9 ACQUIRED HYPOTHYROIDISM: ICD-10-CM

## 2019-01-08 DIAGNOSIS — R80.9 DIABETES MELLITUS WITH MICROALBUMINURIA (HCC): ICD-10-CM

## 2019-01-08 DIAGNOSIS — E11.29 DIABETES MELLITUS WITH MICROALBUMINURIA (HCC): ICD-10-CM

## 2019-01-08 RX ORDER — GLIPIZIDE 10 MG/1
10 TABLET, FILM COATED, EXTENDED RELEASE ORAL 2 TIMES DAILY
Qty: 180 TAB | Refills: 1 | Status: SHIPPED | OUTPATIENT
Start: 2019-01-08 | End: 2019-07-05 | Stop reason: SDUPTHER

## 2019-01-08 RX ORDER — LEVOTHYROXINE SODIUM 75 UG/1
75 TABLET ORAL
Qty: 90 TAB | Refills: 0 | Status: SHIPPED | OUTPATIENT
Start: 2019-01-08

## 2019-01-08 NOTE — TELEPHONE ENCOUNTER
Requested Prescriptions     Pending Prescriptions Disp Refills    glipiZIDE SR (GLUCOTROL XL) 10 mg CR tablet 180 Tab 1     Sig: Take 1 Tab by mouth two (2) times a day.  levothyroxine (SYNTHROID) 75 mcg tablet 90 Tab 0     Sig: Take 1 Tab by mouth Daily (before breakfast). Patient calling to request refill on: glipizide  Remaining pills: 4  Last appt: 10/24/2018  Next appt: 01/30/2019  Pharmacy: [facility] Patient aware of 72 hour time frame. Patient calling to request refill on: levothyroxine  Remaining pills: 4  Last appt: 10/21/2018  Next appt: 1/30/2019  Pharmacy: [address]  Patient aware of 72 hour time frame.

## 2019-01-15 RX ORDER — AMLODIPINE BESYLATE 10 MG/1
10 TABLET ORAL DAILY
Qty: 90 TAB | Refills: 0 | Status: SHIPPED | OUTPATIENT
Start: 2019-01-15 | End: 2019-04-15 | Stop reason: SDUPTHER

## 2019-01-15 NOTE — TELEPHONE ENCOUNTER
Requested Prescriptions     Pending Prescriptions Disp Refills    amLODIPine (NORVASC) 10 mg tablet 90 Tab 0     Sig: Take 1 Tab by mouth daily. Patient calling to request refill on: 1/15/19      Remaining pills: tomorrow   Last appt: 10/24/18  Next appt: 1/30/19    Pharmacy: [address] [place] [address]      Patient aware of 72 hour time frame. He is requesting a 90 day supply.

## 2019-01-21 NOTE — TELEPHONE ENCOUNTER
Requested Prescriptions     Pending Prescriptions Disp Refills    losartan (COZAAR) 50 mg tablet 90 Tab 1     Sig: Take 1 Tab by mouth daily. Remaining pills:4  Last appt:10/24/18  Next appt:1/30/19    [facility]      Patient aware of 72 hour time frame.

## 2019-01-22 RX ORDER — LOSARTAN POTASSIUM 50 MG/1
50 TABLET ORAL DAILY
Qty: 90 TAB | Refills: 1 | Status: SHIPPED | OUTPATIENT
Start: 2019-01-22 | End: 2019-01-30 | Stop reason: SDUPTHER

## 2019-01-24 DIAGNOSIS — R80.9 DIABETES MELLITUS WITH MICROALBUMINURIA (HCC): Primary | ICD-10-CM

## 2019-01-24 DIAGNOSIS — E03.9 ACQUIRED HYPOTHYROIDISM: ICD-10-CM

## 2019-01-24 DIAGNOSIS — Z12.5 PROSTATE CANCER SCREENING: ICD-10-CM

## 2019-01-24 DIAGNOSIS — Z11.59 NEED FOR HEPATITIS C SCREENING TEST: ICD-10-CM

## 2019-01-24 DIAGNOSIS — E78.2 MIXED HYPERLIPIDEMIA: ICD-10-CM

## 2019-01-24 DIAGNOSIS — E11.29 DIABETES MELLITUS WITH MICROALBUMINURIA (HCC): Primary | ICD-10-CM

## 2019-01-24 DIAGNOSIS — I10 ESSENTIAL HYPERTENSION: ICD-10-CM

## 2019-01-25 LAB
ALBUMIN SERPL-MCNC: 4.2 G/DL (ref 3.6–4.8)
ALBUMIN/GLOB SERPL: 1.3 {RATIO} (ref 1.2–2.2)
ALP SERPL-CCNC: 82 IU/L (ref 39–117)
ALT SERPL-CCNC: 30 IU/L (ref 0–44)
AST SERPL-CCNC: 25 IU/L (ref 0–40)
BILIRUB SERPL-MCNC: <0.2 MG/DL (ref 0–1.2)
BUN SERPL-MCNC: 14 MG/DL (ref 8–27)
BUN/CREAT SERPL: 15 (ref 10–24)
CALCIUM SERPL-MCNC: 9.5 MG/DL (ref 8.6–10.2)
CHLORIDE SERPL-SCNC: 95 MMOL/L (ref 96–106)
CHOLEST SERPL-MCNC: 137 MG/DL (ref 100–199)
CO2 SERPL-SCNC: 22 MMOL/L (ref 20–29)
CREAT SERPL-MCNC: 0.95 MG/DL (ref 0.76–1.27)
ERYTHROCYTE [DISTWIDTH] IN BLOOD BY AUTOMATED COUNT: 15.8 % (ref 12.3–15.4)
EST. AVERAGE GLUCOSE BLD GHB EST-MCNC: 246 MG/DL
GLOBULIN SER CALC-MCNC: 3.3 G/DL (ref 1.5–4.5)
GLUCOSE SERPL-MCNC: 228 MG/DL (ref 65–99)
HBA1C MFR BLD: 10.2 % (ref 4.8–5.6)
HCT VFR BLD AUTO: 36.8 % (ref 37.5–51)
HCV AB S/CO SERPL IA: 0.1 S/CO RATIO (ref 0–0.9)
HDLC SERPL-MCNC: 27 MG/DL
HGB BLD-MCNC: 11.7 G/DL (ref 13–17.7)
INTERPRETATION, 910389: NORMAL
LDLC SERPL CALC-MCNC: 68 MG/DL (ref 0–99)
Lab: NORMAL
MCH RBC QN AUTO: 26.6 PG (ref 26.6–33)
MCHC RBC AUTO-ENTMCNC: 31.8 G/DL (ref 31.5–35.7)
MCV RBC AUTO: 84 FL (ref 79–97)
PLATELET # BLD AUTO: 143 X10E3/UL (ref 150–379)
POTASSIUM SERPL-SCNC: 4.3 MMOL/L (ref 3.5–5.2)
PROT SERPL-MCNC: 7.5 G/DL (ref 6–8.5)
PSA SERPL-MCNC: 1.2 NG/ML (ref 0–4)
RBC # BLD AUTO: 4.4 X10E6/UL (ref 4.14–5.8)
SODIUM SERPL-SCNC: 136 MMOL/L (ref 134–144)
T4 FREE SERPL-MCNC: 1.08 NG/DL (ref 0.82–1.77)
TRIGL SERPL-MCNC: 211 MG/DL (ref 0–149)
TSH SERPL DL<=0.005 MIU/L-ACNC: 1.93 UIU/ML (ref 0.45–4.5)
VLDLC SERPL CALC-MCNC: 42 MG/DL (ref 5–40)
WBC # BLD AUTO: 7.8 X10E3/UL (ref 3.4–10.8)

## 2019-01-30 ENCOUNTER — OFFICE VISIT (OUTPATIENT)
Dept: FAMILY MEDICINE CLINIC | Age: 64
End: 2019-01-30

## 2019-01-30 ENCOUNTER — TELEPHONE (OUTPATIENT)
Dept: FAMILY MEDICINE CLINIC | Age: 64
End: 2019-01-30

## 2019-01-30 VITALS
WEIGHT: 275.2 LBS | OXYGEN SATURATION: 96 % | DIASTOLIC BLOOD PRESSURE: 90 MMHG | SYSTOLIC BLOOD PRESSURE: 162 MMHG | TEMPERATURE: 96.5 F | HEART RATE: 105 BPM | RESPIRATION RATE: 20 BRPM | HEIGHT: 69 IN | BODY MASS INDEX: 40.76 KG/M2

## 2019-01-30 DIAGNOSIS — G47.33 OSA ON CPAP: ICD-10-CM

## 2019-01-30 DIAGNOSIS — E78.2 MIXED HYPERLIPIDEMIA: ICD-10-CM

## 2019-01-30 DIAGNOSIS — I10 ESSENTIAL HYPERTENSION: ICD-10-CM

## 2019-01-30 DIAGNOSIS — R80.9 DIABETES MELLITUS WITH MICROALBUMINURIA (HCC): Primary | ICD-10-CM

## 2019-01-30 DIAGNOSIS — E66.01 MORBID OBESITY (HCC): ICD-10-CM

## 2019-01-30 DIAGNOSIS — Z99.89 OSA ON CPAP: ICD-10-CM

## 2019-01-30 DIAGNOSIS — E11.29 DIABETES MELLITUS WITH MICROALBUMINURIA (HCC): Primary | ICD-10-CM

## 2019-01-30 DIAGNOSIS — E03.9 ACQUIRED HYPOTHYROIDISM: ICD-10-CM

## 2019-01-30 RX ORDER — LOSARTAN POTASSIUM 100 MG/1
100 TABLET ORAL DAILY
Qty: 90 TAB | Refills: 0 | Status: SHIPPED | OUTPATIENT
Start: 2019-01-30 | End: 2019-07-22 | Stop reason: SDUPTHER

## 2019-01-30 RX ORDER — TOPIRAMATE 25 MG/1
25 TABLET ORAL
Qty: 90 TAB | Refills: 0 | Status: SHIPPED | OUTPATIENT
Start: 2019-01-30 | End: 2019-04-24 | Stop reason: SDUPTHER

## 2019-01-30 NOTE — PROGRESS NOTES
HISTORY OF PRESENT ILLNESS Darius Ruiz is a 59 y.o. male. HPI 
DM with microalbuminuria, not controlled, recent labs noted, A1c 10.2, his fasting glucose 228, he is not compliant with diet, he declined to start insulin, he is on metformin/victoza and glucotrol, he was not seen by Endocrinology yet, we placed referral last visit but he stated that they did not take his insurance!, he is on losartan for the microalbuminuria HTN, not controlled, his bp is elevated today Hypothyroid, stable on synthroid daily, recent tsh was normal 
HLD, stable, recent ldl was 68 Morbid obesity, not controlled, he did not lose any weight!, discussed compliance with diet, discussed meds/bariatrics, he agreed on trying topamax, declined referral for Bariatrics surgery consult Review of Systems Constitutional: Negative for malaise/fatigue. Respiratory: Negative for cough and shortness of breath. Cardiovascular: Negative for chest pain and palpitations. Gastrointestinal: Negative for abdominal pain. Musculoskeletal: Negative for myalgias. Neurological: Negative for dizziness and headaches. Physical Exam  
Constitutional: He is oriented to person, place, and time. Neck: No thyromegaly present. Cardiovascular: Normal rate, regular rhythm and normal heart sounds. Pulmonary/Chest: Effort normal and breath sounds normal. He has no rales. Abdominal: Soft. There is no tenderness. Musculoskeletal: He exhibits no edema. Neurological: He is alert and oriented to person, place, and time. Vitals reviewed. ASSESSMENT and PLAN Diagnoses and all orders for this visit: 1. Diabetes mellitus with microalbuminuria ([facility] [address] not controlled, will refer him to Dr Arun Corado, we called his insurance and he does accept it, I stressed the importance of this consult with pt, we need to control his Diabetes, discussed compliance with diet also 
-     REFERRAL TO ENDOCRINOLOGY 2. Acquired hypothyroidism, stable, continue synthroid 3. Mixed hyperlipidemia, stable, continue zocor 4. Essential hypertension, not well controlled 
-     losartan (COZAAR) 100 mg tablet; Take 1 Tab by mouth daily. 5. HELEN on CPAP, pt need cpap supplies order -     AMB SUPPLY ORDER 
 
6. Morbid obesity ([address] start: 
-     topiramate (TOPAMAX) 25 mg tablet; Take 1 Tab by mouth daily (with breakfast). Lab Results Component Value Date/Time Hemoglobin A1c 10.2 (H) 01/24/2019 01:47 AM  
 
Lab Results Component Value Date/Time Sodium 136 01/24/2019 01:47 AM  
 Potassium 4.3 01/24/2019 01:47 AM  
 Chloride 95 (L) 01/24/2019 01:47 AM  
 CO2 22 01/24/2019 01:47 AM  
 Glucose 228 (H) 01/24/2019 01:47 AM  
 BUN 14 01/24/2019 01:47 AM  
 Creatinine 0.95 01/24/2019 01:47 AM  
 BUN/Creatinine ratio 15 01/24/2019 01:47 AM  
 GFR est AA 99 01/24/2019 01:47 AM  
 GFR est non-AA 85 01/24/2019 01:47 AM  
 Calcium 9.5 01/24/2019 01:47 AM  
 Bilirubin, total <0.2 01/24/2019 01:47 AM  
 AST (SGOT) 25 01/24/2019 01:47 AM  
 Alk. phosphatase 82 01/24/2019 01:47 AM  
 Protein, total 7.5 01/24/2019 01:47 AM  
 Albumin 4.2 01/24/2019 01:47 AM  
 A-G Ratio 1.3 01/24/2019 01:47 AM  
 ALT (SGPT) 30 01/24/2019 01:47 AM  
 
Lab Results Component Value Date/Time TSH 1.930 01/24/2019 01:47 AM  
 
Lab Results Component Value Date/Time Cholesterol, total 137 01/24/2019 01:47 AM  
 HDL Cholesterol 27 (L) 01/24/2019 01:47 AM  
 LDL, calculated 68 01/24/2019 01:47 AM  
 VLDL, calculated 42 (H) 01/24/2019 01:47 AM  
 Triglyceride 211 (H) 01/24/2019 01:47 AM  
 
rtc 2 mos

## 2019-01-30 NOTE — PROGRESS NOTES
Brenda Pace is a 59 y.o. male (: 1955) presenting to address: Chief Complaint Patient presents with  Medication Evaluation  
  follow up Vitals:  
 19 1448 BP: 162/90 Pulse: (!) 105 Resp: 20 Temp: 96.5 °F (35.8 °C) TempSrc: Oral  
SpO2: 96% Weight: 275 lb 3.2 oz (124.8 kg) Height: 5' 9\" (1.753 m) PainSc:   0 - No pain Hearing/Vision: No exam data present Learning Assessment:  
No flowsheet data found. Depression Screening: PHQ over the last two weeks 2019 Little interest or pleasure in doing things Not at all Feeling down, depressed, irritable, or hopeless Not at all Total Score PHQ 2 0 Fall Risk Assessment:  
No flowsheet data found. Abuse Screening:  
 
Abuse Screening Questionnaire 2018 Do you ever feel afraid of your partner? [facility] Are you in a relationship with someone who physically or mentally threatens you? [facility] Is it safe for you to go home? Nohemi Ruiz Coordination of Care Questionaire: 1. Have you been to the ER, urgent care clinic since your last visit? Hospitalized since your last visit? NO 
 
2. Have you seen or consulted any other health care providers outside of the [address] [place] [address] since your last visit? Include any pap smears or colon screening. NO Advanced Directive: 1. Do you have an Advanced Directive? NO 
 
2. Would you like information on Advanced Directives?  NO

## 2019-01-30 NOTE — TELEPHONE ENCOUNTER
Pt came in to be seen today. He said he recently had labs done on 1/24/19. He was wondering if he will need to do labs again before his next appointment.     Future Appointments   Date Time Provider Rohit Souza   3/27/2019  3:30 PM Jaqueline Harman MD [facility]

## 2019-03-13 ENCOUNTER — TELEPHONE (OUTPATIENT)
Dept: FAMILY MEDICINE CLINIC | Age: 64
End: 2019-03-13

## 2019-03-13 NOTE — TELEPHONE ENCOUNTER
Pt call: stating that the Referral with the  endocrinology will not schedule him. Pt stated that they said that didn't know what they were seeing him for. Pt said he has been trying to make an appointment since the beginning of Feb.  Pt would like a call back.

## 2019-03-14 NOTE — TELEPHONE ENCOUNTER
I called the Endocrinologist Office and they have tried to contact patient on two separate occasions (1/30/18, 02/18/19) and left messages. Patient never returned the call. I tried to call patient to inform him and I left a message for him to return my call.

## 2019-03-15 ENCOUNTER — TELEPHONE (OUTPATIENT)
Dept: FAMILY MEDICINE CLINIC | Age: 64
End: 2019-03-15

## 2019-03-15 NOTE — TELEPHONE ENCOUNTER
See previous note. The endocrinologist has been calling to schedule with him and he does not return their calls.

## 2019-03-15 NOTE — TELEPHONE ENCOUNTER
Pt is requesting that referral be resent to endocrinologist because he reached out to them and they said they have no files or anything on pt.  Call if any questions [phone]

## 2019-03-21 DIAGNOSIS — F51.01 PRIMARY INSOMNIA: ICD-10-CM

## 2019-03-21 NOTE — TELEPHONE ENCOUNTER
Requested Prescriptions     Pending Prescriptions Disp Refills    amitriptyline (ELAVIL) 50 mg tablet 90 Tab 1     Sig: Take 1 Tab by mouth nightly. Patient calling to request refill on: amitriptyline      Remaining pills: 0  Last appt: 1/30/2019  Next appt: 4/10/2019    Pharmacy: [facility]      Patient aware of 72 hour time frame.

## 2019-03-24 RX ORDER — AMITRIPTYLINE HYDROCHLORIDE 50 MG/1
50 TABLET, FILM COATED ORAL
Qty: 90 TAB | Refills: 1 | Status: SHIPPED | OUTPATIENT
Start: 2019-03-24 | End: 2019-04-01 | Stop reason: SDUPTHER

## 2019-03-28 ENCOUNTER — TELEPHONE (OUTPATIENT)
Dept: FAMILY MEDICINE CLINIC | Age: 64
End: 2019-03-28

## 2019-03-28 NOTE — TELEPHONE ENCOUNTER
Patient is calling requesting a prescription that states \" C-PAP supplies\"  Please fax to  [phone], [employer] [address]

## 2019-04-01 ENCOUNTER — TELEPHONE (OUTPATIENT)
Dept: FAMILY MEDICINE CLINIC | Age: 64
End: 2019-04-01

## 2019-04-01 DIAGNOSIS — F51.01 PRIMARY INSOMNIA: ICD-10-CM

## 2019-04-01 RX ORDER — SIMVASTATIN 20 MG/1
20 TABLET, FILM COATED ORAL
Qty: 90 TAB | Refills: 1 | Status: SHIPPED | OUTPATIENT
Start: 2019-04-01

## 2019-04-01 RX ORDER — AMITRIPTYLINE HYDROCHLORIDE 50 MG/1
50 TABLET, FILM COATED ORAL
Qty: 90 TAB | Refills: 1 | Status: SHIPPED | OUTPATIENT
Start: 2019-04-01

## 2019-04-01 RX ORDER — SIMVASTATIN 20 MG/1
TABLET, FILM COATED ORAL
Qty: 90 TAB | Refills: 1 | Status: SHIPPED | OUTPATIENT
Start: 2019-04-01

## 2019-04-01 NOTE — TELEPHONE ENCOUNTER
Requested Prescriptions     Pending Prescriptions Disp Refills    simvastatin (ZOCOR) 20 mg tablet 90 Tab 1     Sig: Take 1 Tab by mouth nightly.  amitriptyline (ELAVIL) 50 mg tablet 90 Tab 1     Sig: Take 1 Tab by mouth nightly. Patient calling to request refill on: 4/1/19      Remaining pills: 0  Last appt: 1/30/19  Next appt:  4/10/19    [facility]       The amitriptyline was already order however the pt states that the pharmacy dies not have it.  Please advise

## 2019-04-01 NOTE — TELEPHONE ENCOUNTER
There was already an order for C-pap supplies done on 01/19 so I reprinted it and faxed it where the patient requested.

## 2019-04-01 NOTE — TELEPHONE ENCOUNTER
Pt is requesting a script for c-pap supplies. He said that his hose needs to be replaced and so does the mask.

## 2019-04-01 NOTE — TELEPHONE ENCOUNTER
Pt called wanting to know the status of his C-pap supplies. I informed him of Morgans message but he did not know what she meant by that and neither did I. He states that he has to have an order from the doctor for it. Please return his call at the earliest convenience.

## 2019-04-02 ENCOUNTER — TELEPHONE (OUTPATIENT)
Dept: FAMILY MEDICINE CLINIC | Age: 64
End: 2019-04-02

## 2019-04-02 NOTE — TELEPHONE ENCOUNTER
Patient called and he specifically needs a prescription that states \" C-PAP supplies\"  The company requires is at a script and not an order.  Please fax to [phone], [employer] [address]

## 2019-04-15 RX ORDER — AMLODIPINE BESYLATE 10 MG/1
10 TABLET ORAL DAILY
Qty: 90 TAB | Refills: 0 | Status: SHIPPED | OUTPATIENT
Start: 2019-04-15 | End: 2019-07-16 | Stop reason: SDUPTHER

## 2019-04-15 NOTE — TELEPHONE ENCOUNTER
Requested Prescriptions     Pending Prescriptions Disp Refills    amLODIPine (NORVASC) 10 mg tablet 90 Tab 0     Sig: Take 1 Tab by mouth daily. Patient calling to request refill on:      Remaining pills:N/A  Last appt:01/30/2019  Next appt:No future appts    Pharmacy:  [id number]    Patient aware of 72 hour time frame.

## 2019-04-24 DIAGNOSIS — E66.01 MORBID OBESITY (HCC): ICD-10-CM

## 2019-04-24 RX ORDER — TOPIRAMATE 25 MG/1
25 TABLET ORAL
Qty: 90 TAB | Refills: 0 | Status: SHIPPED | OUTPATIENT
Start: 2019-04-24 | End: 2019-07-02 | Stop reason: SDUPTHER

## 2019-04-24 NOTE — TELEPHONE ENCOUNTER
Patient calling to request refill on: Topiramate (TOPAMAX) 25 mg tablet       Remaining pills:  Last appt: 01/30/2019  Next appt: No future appointments     Pharmacy: [address] [place] [address]. Patient aware of 72 hour time frame.

## 2019-05-08 ENCOUNTER — TELEPHONE (OUTPATIENT)
Dept: FAMILY MEDICINE CLINIC | Age: 64
End: 2019-05-08

## 2019-05-08 NOTE — TELEPHONE ENCOUNTER
Pt called to state that he saw his endocrinologist, Dr Workman Fairly, and he says he needs to check his blood sugars twice a day instead of once. He said that the [facility] mail pharmacy will be sending in a prescription for this refill but he wanted Dr. Winston Rose to know that he is supposed to have it checked twice a day.

## 2019-05-16 NOTE — TELEPHONE ENCOUNTER
Patient calling to request refill on: metformin      Remaining pills:2  Last appt: 1/30/2019  Next appt: none    Pharmacy: [facility]      Patient aware of 72 hour time frame.

## 2019-05-17 RX ORDER — METFORMIN HYDROCHLORIDE 1000 MG/1
1000 TABLET ORAL 2 TIMES DAILY WITH MEALS
Qty: 180 TAB | Refills: 0 | Status: SHIPPED | OUTPATIENT
Start: 2019-05-17 | End: 2019-08-12 | Stop reason: SDUPTHER

## 2019-05-17 NOTE — TELEPHONE ENCOUNTER
Pt was seen by Dr Esme Terry on 4-3-19 for his DM, need to get further refills from him, also make sure he has follow up appt with him regarding his DM

## 2019-05-20 NOTE — TELEPHONE ENCOUNTER
Attempted to contact patient to inquire about follow up and instruct him on getting his diabetic medication filled. No answer.

## 2019-05-22 NOTE — TELEPHONE ENCOUNTER
Requested Prescriptions     Pending Prescriptions Disp Refills    cloNIDine HCl (CATAPRES) 0.3 mg tablet 90 Tab 1     Sig: Take 1 Tab by mouth every morning. Patient calling to request refill on: 5.22.19      Remaining pills:  Last appt: 1.3.19  Next appt: none    Pharmacy:  [address] [place] [address]      Patient aware of 72 hour time frame. Pt called to request a refill on his meds. He is requesting for it to be sent in before Friday. I did inform the pt that Dr Law Yanes is out of the office until next week Tuesday. Please advise w/ a covering provider.

## 2019-05-23 ENCOUNTER — TELEPHONE (OUTPATIENT)
Dept: FAMILY MEDICINE CLINIC | Age: 64
End: 2019-05-23

## 2019-05-23 RX ORDER — CLONIDINE HYDROCHLORIDE 0.3 MG/1
0.3 TABLET ORAL
Qty: 90 TAB | Refills: 0 | Status: SHIPPED | OUTPATIENT
Start: 2019-05-23 | End: 2019-08-19 | Stop reason: SDUPTHER

## 2019-07-02 DIAGNOSIS — E66.01 MORBID OBESITY (HCC): ICD-10-CM

## 2019-07-02 DIAGNOSIS — E11.29 DIABETES MELLITUS WITH MICROALBUMINURIA (HCC): ICD-10-CM

## 2019-07-02 DIAGNOSIS — R80.9 DIABETES MELLITUS WITH MICROALBUMINURIA (HCC): ICD-10-CM

## 2019-07-02 RX ORDER — GLIPIZIDE 10 MG/1
10 TABLET, FILM COATED, EXTENDED RELEASE ORAL 2 TIMES DAILY
Qty: 180 TAB | Refills: 1 | OUTPATIENT
Start: 2019-07-02

## 2019-07-02 RX ORDER — TOPIRAMATE 25 MG/1
25 TABLET ORAL
Qty: 90 TAB | Refills: 0 | Status: SHIPPED | OUTPATIENT
Start: 2019-07-02

## 2019-07-02 NOTE — TELEPHONE ENCOUNTER
Requested Prescriptions     Pending Prescriptions Disp Refills    glipiZIDE SR (GLUCOTROL XL) 10 mg CR tablet 180 Tab 1     Sig: Take 1 Tab by mouth two (2) times a day.  topiramate (TOPAMAX) 25 mg tablet 90 Tab 0     Sig: Take 1 Tab by mouth daily (with breakfast). Remaining pills:3  Last appt:04/10/19  Next appt:N/A    Pharmacy:[facility]      Patient aware of 72 hour time frame.

## 2019-07-22 DIAGNOSIS — I10 ESSENTIAL HYPERTENSION: ICD-10-CM

## 2019-07-22 RX ORDER — LOSARTAN POTASSIUM 100 MG/1
100 TABLET ORAL DAILY
Qty: 90 TAB | Refills: 0 | Status: SHIPPED | OUTPATIENT
Start: 2019-07-22

## 2019-07-22 NOTE — TELEPHONE ENCOUNTER
Last OV was 1-2019  Need follow up appt  Also ask pt if he has been seen by his Endocrinologist for his uncontrolled DM?

## 2019-07-22 NOTE — TELEPHONE ENCOUNTER
Requested Prescriptions     Pending Prescriptions Disp Refills    losartan (COZAAR) 100 mg tablet 90 Tab 0     Sig: Take 1 Tab by mouth daily. Patient calling to request refill on:      Remaining pills:  Last appt:  Next appt:  No future appointments. Pharmacy:  [facility]    Patient aware of 72 hour time frame.

## 2019-07-23 NOTE — TELEPHONE ENCOUNTER
Attempted to call pt to schedule f/u and ask about endocrinology. Phone rung and never went to voicemail. Unable to leave voicemail.

## 2019-07-29 ENCOUNTER — TELEPHONE (OUTPATIENT)
Dept: FAMILY MEDICINE CLINIC | Age: 64
End: 2019-07-29

## 2019-07-29 NOTE — TELEPHONE ENCOUNTER
Pt need to be on the 100 mg, he need to comply with his medications as we prescribe it, this is not acceptable/ non compliance, otherwise he need to find another pcp that is willing to Rx what he wants ( and not what he needs).

## 2019-07-29 NOTE — TELEPHONE ENCOUNTER
Pt called in regards to his bp medication losartan that was recently called in. He is upset that it got changed from 50mg to 100mg without his knowledge. He wants his medication to be changed back to 50mg because the side effects for the 100mg is too much a risk for him. He states that even the pharmacist agrees with him that it's too much. I reviewed his chart and informed him that at his 1/30/19 ov Dr Michelle Suarez had changed it from the 50mg to 100mg due to not well controlled hypertension. Pt accused Dr Michelle Suarez of \"playing by the book\" he states that he has good bp but they changed the numbers to where he would have good bp. Pt is going to be leaving later this afternoon for [place]. Please advise he states that he will stop taking it before he will take the 100mg.

## 2019-08-05 DIAGNOSIS — R80.9 DIABETES MELLITUS WITH MICROALBUMINURIA (HCC): ICD-10-CM

## 2019-08-05 DIAGNOSIS — E11.29 DIABETES MELLITUS WITH MICROALBUMINURIA (HCC): ICD-10-CM

## 2019-08-05 RX ORDER — GLIPIZIDE 10 MG/1
TABLET, FILM COATED, EXTENDED RELEASE ORAL
Qty: 60 TAB | Refills: 0 | Status: CANCELLED | OUTPATIENT
Start: 2019-08-05

## 2019-08-05 NOTE — TELEPHONE ENCOUNTER
Requested Prescriptions     Pending Prescriptions Disp Refills    glipiZIDE SR (GLUCOTROL XL) 10 mg CR tablet 60 Tab 0     Patient calling to request refill on:      Remaining pills:  Last appt:  Next appt:    Pharmacy:      Patient aware of 72 hour time frame. Pt is requesting the medication to be a 90 day supply because his ins will only cover 90 day supplies.  Please advise

## 2019-08-12 NOTE — TELEPHONE ENCOUNTER
Patient calling to request refill on:  metformin    Remaining pills:  Last appt: Monday, April 01, 2019   Next appt:    Pharmacy:[facility]      Patient aware of 72 hour time frame. Requested Prescriptions     Pending Prescriptions Disp Refills    metFORMIN (GLUCOPHAGE) 1,000 mg tablet 180 Tab 0     Sig: Take 1 Tab by mouth two (2) times daily (with meals).

## 2019-08-12 NOTE — TELEPHONE ENCOUNTER
Patient was told that he needed to get all diabetic medications from his Endocrinologist. PSR still forwarded the message.

## 2019-08-13 RX ORDER — METFORMIN HYDROCHLORIDE 1000 MG/1
1000 TABLET ORAL 2 TIMES DAILY WITH MEALS
Qty: 180 TAB | Refills: 0 | Status: SHIPPED | OUTPATIENT
Start: 2019-08-13

## 2019-08-13 NOTE — TELEPHONE ENCOUNTER
Please advise pt that I did refill this Metformin Rx for now, and put a note to the pharmacy to send further refills to Endo  Please call Dr Doyle's office and ask them to fax us pt's last notes and A1c results

## 2019-08-19 ENCOUNTER — TELEPHONE (OUTPATIENT)
Dept: FAMILY MEDICINE CLINIC | Age: 64
End: 2019-08-19

## 2019-08-19 RX ORDER — CLONIDINE HYDROCHLORIDE 0.3 MG/1
0.3 TABLET ORAL
Qty: 90 TAB | Refills: 0 | Status: SHIPPED | OUTPATIENT
Start: 2019-08-19

## 2019-08-19 NOTE — TELEPHONE ENCOUNTER
Patient calling to request refill on:clonidine      Remaining pills:3  Last appt: Monday, April 01, 2019  Next appt:    Pharmacy:[facility]      Patient aware of 72 hour time frame. Requested Prescriptions     Pending Prescriptions Disp Refills    cloNIDine HCl (CATAPRES) 0.3 mg tablet 90 Tab 0     Sig: Take 1 Tab by mouth every morning.

## 2019-10-11 NOTE — TELEPHONE ENCOUNTER
Done, labs ordered
Pt called to request for the rest of his labs to be placed before his appt because he wants to come in early to get them doen because the labs will be closed when he is done with his upcoming appt.      Future Appointments   Date Time Provider Rohit Souza   10/24/2018  2:30 PM Umair Case MD [facility]
Quality 226: Preventive Care And Screening: Tobacco Use: Screening And Cessation Intervention: Patient screened for tobacco use and is an ex/non-smoker
Quality 130: Documentation Of Current Medications In The Medical Record: Current Medications Documented
Detail Level: Detailed
Quality 110: Preventive Care And Screening: Influenza Immunization: Influenza Immunization not Administered because Patient Refused.
Quality 431: Preventive Care And Screening: Unhealthy Alcohol Use - Screening: Patient screened for unhealthy alcohol use using a single question and scores less than 2 times per year

## 2023-11-29 NOTE — TELEPHONE ENCOUNTER
Patient is requesting a script for a new Accu-Chek Jacki Plus Test Kit. He states that it is not working properly. no